# Patient Record
Sex: FEMALE | Race: BLACK OR AFRICAN AMERICAN | Employment: STUDENT | URBAN - METROPOLITAN AREA
[De-identification: names, ages, dates, MRNs, and addresses within clinical notes are randomized per-mention and may not be internally consistent; named-entity substitution may affect disease eponyms.]

---

## 2020-11-23 ENCOUNTER — OFFICE VISIT (OUTPATIENT)
Dept: OBGYN | Facility: CLINIC | Age: 25
End: 2020-11-23
Payer: COMMERCIAL

## 2020-11-23 VITALS — SYSTOLIC BLOOD PRESSURE: 106 MMHG | WEIGHT: 158 LBS | DIASTOLIC BLOOD PRESSURE: 60 MMHG

## 2020-11-23 DIAGNOSIS — Z12.4 PAP SMEAR FOR CERVICAL CANCER SCREENING: Primary | ICD-10-CM

## 2020-11-23 PROCEDURE — G0145 SCR C/V CYTO,THINLAYER,RESCR: HCPCS | Performed by: ADVANCED PRACTICE MIDWIFE

## 2020-11-23 PROCEDURE — 99202 OFFICE O/P NEW SF 15 MIN: CPT | Performed by: ADVANCED PRACTICE MIDWIFE

## 2020-11-23 SDOH — HEALTH STABILITY: MENTAL HEALTH: HOW OFTEN DO YOU HAVE A DRINK CONTAINING ALCOHOL?: NEVER

## 2020-11-23 NOTE — PROGRESS NOTES
"SUBJECTIVE: Jodee Robbins 25 year old  is here today for a Pap Smear.  She has never had a Pap smear before.  She is up here visiting her mother from [place].  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  She is very nervous and explains that she has had trouble having intercourse or any sort of penetration due to pain.  She also explains that she does not feel that she has good lubrication in her vagina that is very dry during sexual contact.  She has had partners who have tried manual penetration, but it is too painful for her.  She does think that she had intercourse during the summer with 1 person, but explains that even though it was consensual, they were both intoxicated so she does not remember.  Explains that it mostly hurts at insertion and then may feel better after that.  She also states here \"I am not sure my hymen is broken \".  We discussed that even with IC, her hymen may not have broken.  She did have some blood with wiping after the episode this summer.  She reports that she wants to have a normal sex life and does get pleasure from foreplay.  She wonders if her vagina is \"broken\".  We discussed that lubrication amount is different from woman to woman and may be impacted psychologically.  We discussed that treatment for vaginismus generally includes pelvic floor PT and counseling.  We troubleshot ways she could decrease sensitivity on her own as well, by using her vibrator inside in vagina as well as outside her vagina.  Recommended she begin by stimulating externally, then using lubrication on the vibrator and inserting it into her vagina.  Also discussed she may purchase a box of tampons with multiple sizes and inserting them in her vagina, gradually increasing the size according to her comfort.  She declines STI testing today, though encouraged.  She also is unsure if she has received the HPV vaccine.  Unable to view records from [place] today.  Advised her to ask her mother, and consider " the vaccine if she has not had it.  She has a physical scheduled with family medicine on 11/27/20.      ROS:  12 point review of systems negative other than symptoms noted below or in the HPI.  Constitutional: none  Genitourinary: dyspareunia    PHYSICAL EXAM:  /60 (BP Location: Right arm, Cuff Size: Adult Regular)   Wt 71.7 kg (158 lb)   LMP 11/05/2020   General appearance:  healthy, alert and no distress  Pelvic Exam:  Vulva: No lesions, no adenopathy, BUS WNL  Vagina: Moist, pink, discharge normal  well rugated, no lesions  Cervix: Pap collected smooth, pink, no visible lesions  Rectal exam: deferred      ASSESSMENT/PLAN:   Vaginismus  Cervical cancer screening      COUNSELING:  Advised attempting vaginal insertion with lubricated tampons or sexual toys, gradually increasing in size as she tolerates it  Recommended pelvic floor PT and mental health counseling upon return to [place]  Reassured that vaginismus is treatable and it is very promising that she was able to tolerate her pelvic exam and pap today.      Lyudmila Mclean CNM     TT: 20 min with > 50% of the visit spent on counseling and plan of care

## 2020-11-25 LAB
COPATH REPORT: NORMAL
PAP: NORMAL

## 2020-11-30 ENCOUNTER — OFFICE VISIT (OUTPATIENT)
Dept: FAMILY MEDICINE | Facility: CLINIC | Age: 25
End: 2020-11-30
Payer: COMMERCIAL

## 2020-11-30 VITALS
TEMPERATURE: 99.3 F | DIASTOLIC BLOOD PRESSURE: 64 MMHG | OXYGEN SATURATION: 99 % | HEIGHT: 66 IN | WEIGHT: 161 LBS | HEART RATE: 93 BPM | BODY MASS INDEX: 25.88 KG/M2 | SYSTOLIC BLOOD PRESSURE: 104 MMHG

## 2020-11-30 DIAGNOSIS — Z00.00 ROUTINE GENERAL MEDICAL EXAMINATION AT A HEALTH CARE FACILITY: ICD-10-CM

## 2020-11-30 PROCEDURE — 90472 IMMUNIZATION ADMIN EACH ADD: CPT | Performed by: FAMILY MEDICINE

## 2020-11-30 PROCEDURE — 86580 TB INTRADERMAL TEST: CPT | Performed by: FAMILY MEDICINE

## 2020-11-30 PROCEDURE — 90471 IMMUNIZATION ADMIN: CPT | Performed by: FAMILY MEDICINE

## 2020-11-30 PROCEDURE — 90715 TDAP VACCINE 7 YRS/> IM: CPT | Performed by: FAMILY MEDICINE

## 2020-11-30 PROCEDURE — 99395 PREV VISIT EST AGE 18-39: CPT | Mod: 25 | Performed by: FAMILY MEDICINE

## 2020-11-30 PROCEDURE — 90651 9VHPV VACCINE 2/3 DOSE IM: CPT | Performed by: FAMILY MEDICINE

## 2020-11-30 ASSESSMENT — MIFFLIN-ST. JEOR: SCORE: 1502.04

## 2020-11-30 NOTE — PROGRESS NOTES
SUBJECTIVE:   CC: Jodee Robbins is an 25 year old woman who presents for preventive health visit.       Patient has been advised of split billing requirements and indicates understanding: Yes  Healthy Habits:    Do you get at least three servings of calcium containing foods daily (dairy, green leafy vegetables, etc.)? yes    Amount of exercise or daily activities, outside of work: Walks around campus    Problems taking medications regularly No    Medication side effects: No    Have you had an eye exam in the past two years? yes    Do you see a dentist twice per year? no    Do you have sleep apnea, excessive snoring or daytime drowsiness?no              Social History     Tobacco Use     Smoking status: Never Smoker     Smokeless tobacco: Never Used   Substance Use Topics     Alcohol use: Never     Frequency: Never     If you drink alcohol do you typically have >3 drinks per day or >7 drinks per week? No                     Reviewed orders with patient.  Reviewed health maintenance and updated orders accordingly - Yes      Pertinent mammograms are reviewed under the imaging tab.  History of abnormal Pap smear: no   PAP / HPV 11/23/2020   PAP NIL     Reviewed and updated as needed this visit by clinical staff  Tobacco  Allergies  Meds  Problems  Med Hx  Surg Hx  Fam Hx          Reviewed and updated as needed this visit by Provider  Tobacco  Allergies  Meds  Problems  Med Hx  Surg Hx  Fam Hx         History reviewed. No pertinent past medical history.   Past Surgical History:   Procedure Laterality Date     NO HISTORY OF SURGERY         ROS:  CONSTITUTIONAL: NEGATIVE for fever, chills, change in weight  INTEGUMENTARU/SKIN: NEGATIVE for worrisome rashes, moles or lesions  EYES: NEGATIVE for vision changes or irritation  ENT: NEGATIVE for ear, mouth and throat problems  RESP: NEGATIVE for significant cough or SOB  BREAST: NEGATIVE for masses, tenderness or discharge  CV: NEGATIVE for chest pain,  "palpitations or peripheral edema  GI: NEGATIVE for nausea, abdominal pain, heartburn, or change in bowel habits  : NEGATIVE for unusual urinary or vaginal symptoms. Periods are regular.  MUSCULOSKELETAL: NEGATIVE for significant arthralgias or myalgia  NEURO: NEGATIVE for weakness, dizziness or paresthesias  PSYCHIATRIC: NEGATIVE for changes in mood or affect    OBJECTIVE:   /64   Pulse 93   Temp 99.3  F (37.4  C)   Ht 1.676 m (5' 6\")   Wt 73 kg (161 lb)   LMP 11/05/2020   SpO2 99%   BMI 25.99 kg/m    EXAM:  GENERAL: healthy, alert and no distress  EYES: Eyes grossly normal to inspection, PERRL and conjunctivae and sclerae normal  HENT: ear canals and TM's normal, nose and mouth without ulcers or lesions  NECK: no adenopathy, no asymmetry, masses, or scars and thyroid normal to palpation  RESP: lungs clear to auscultation - no rales, rhonchi or wheezes  BREAST: normal without masses, tenderness or nipple discharge and no palpable axillary masses or adenopathy  CV: regular rate and rhythm, normal S1 S2, no S3 or S4, no murmur, click or rub, no peripheral edema and peripheral pulses strong  ABDOMEN: soft, nontender, no hepatosplenomegaly, no masses and bowel sounds normal  MS: no gross musculoskeletal defects noted, no edema  SKIN: no suspicious lesions or rashes  NEURO: Normal strength and tone, mentation intact and speech normal  PSYCH: mentation appears normal, affect normal/bright  Pelvic - pap smear done last week .  - defer pap smear .  Diagnostic Test Results:  Labs reviewed in Epic    ASSESSMENT/PLAN:   1. Routine general medical examination at a health care facility  - recommend patient to continue with healthy eating .  - we did discuss immunization.  - Td ap and Grdisal   Vaccination provided .  - tb test performed .    - we did discuss with patient     Patient has been advised of split billing requirements and indicates understanding: Yes  COUNSELING:   Reviewed preventive health counseling, " "as reflected in patient instructions       Regular exercise       Healthy diet/nutrition       Vision screening       Hearing screening       Contraception       Family planning    Estimated body mass index is 25.99 kg/m  as calculated from the following:    Height as of this encounter: 1.676 m (5' 6\").    Weight as of this encounter: 73 kg (161 lb).        She reports that she has never smoked. She has never used smokeless tobacco.      Counseling Resources:  ATP IV Guidelines  Pooled Cohorts Equation Calculator  Breast Cancer Risk Calculator  BRCA-Related Cancer Risk Assessment: FHS-7 Tool  FRAX Risk Assessment  ICSI Preventive Guidelines  Dietary Guidelines for Americans, 2010  USDA's MyPlate  ASA Prophylaxis  Lung CA Screening    Cary Pal MD  [facility]  "

## 2020-12-03 ENCOUNTER — ALLIED HEALTH/NURSE VISIT (OUTPATIENT)
Dept: FAMILY MEDICINE | Facility: CLINIC | Age: 25
End: 2020-12-03
Payer: COMMERCIAL

## 2020-12-03 DIAGNOSIS — Z11.1 SCREENING EXAMINATION FOR PULMONARY TUBERCULOSIS: Primary | ICD-10-CM

## 2020-12-03 LAB
PPDINDURATION: 0 MM (ref 0–5)
PPDREDNESS: 0 MM

## 2020-12-03 PROCEDURE — 99207 PR NO CHARGE NURSE ONLY: CPT

## 2020-12-03 NOTE — PROGRESS NOTES
[place] result:  Lab Results   Component Value Date    PPDREDNESS 0 12/03/2020    PPDINDURATIO 0 12/03/2020     Is induration greater than 5mm?  No     Isabelle Shelton RN- Triage [employer]

## 2021-10-11 ENCOUNTER — HEALTH MAINTENANCE LETTER (OUTPATIENT)
Age: 26
End: 2021-10-11

## 2022-01-30 ENCOUNTER — HEALTH MAINTENANCE LETTER (OUTPATIENT)
Age: 27
End: 2022-01-30

## 2022-09-24 ENCOUNTER — HEALTH MAINTENANCE LETTER (OUTPATIENT)
Age: 27
End: 2022-09-24

## 2023-05-08 ENCOUNTER — HEALTH MAINTENANCE LETTER (OUTPATIENT)
Age: 28
End: 2023-05-08